# Patient Record
Sex: MALE | Race: WHITE
[De-identification: names, ages, dates, MRNs, and addresses within clinical notes are randomized per-mention and may not be internally consistent; named-entity substitution may affect disease eponyms.]

---

## 2019-01-21 ENCOUNTER — HOSPITAL ENCOUNTER (EMERGENCY)
Dept: HOSPITAL 52 - LL.ED | Age: 84
Discharge: NURSING, REHAB OR LONG-TERM CARE | End: 2019-01-21
Payer: COMMERCIAL

## 2019-01-21 DIAGNOSIS — J43.1: ICD-10-CM

## 2019-01-21 DIAGNOSIS — I25.2: ICD-10-CM

## 2019-01-21 DIAGNOSIS — M06.9: ICD-10-CM

## 2019-01-21 DIAGNOSIS — D53.9: ICD-10-CM

## 2019-01-21 DIAGNOSIS — K43.9: ICD-10-CM

## 2019-01-21 DIAGNOSIS — Z87.891: ICD-10-CM

## 2019-01-21 DIAGNOSIS — E79.0: ICD-10-CM

## 2019-01-21 DIAGNOSIS — N18.9: ICD-10-CM

## 2019-01-21 DIAGNOSIS — K21.9: Primary | ICD-10-CM

## 2019-01-21 DIAGNOSIS — I25.10: ICD-10-CM

## 2019-01-21 DIAGNOSIS — I12.9: ICD-10-CM

## 2019-01-21 LAB
CHLORIDE SERPL-SCNC: 102 MMOL/L (ref 98–107)
SODIUM SERPL-SCNC: 142 MMOL/L (ref 136–145)

## 2019-01-21 PROCEDURE — C9113 INJ PANTOPRAZOLE SODIUM, VIA: HCPCS

## 2019-01-21 NOTE — EDM.PDOC
ED HPI GENERAL MEDICAL PROBLEM





- General


Chief Complaint: Abdominal Pain


Stated Complaint: abdominal pain


Time Seen by Provider: 01/21/19 18:05


Source of Information: Reports: Patient, Nursing Home Records, Old Records ([facility] EMR.  No paper hospital chart available.)


History Limitations: Reports: No Limitations





- History of Present Illness


INITIAL COMMENTS - FREE TEXT/NARRATIVE: 





Patient was brought to the emergency room via transport vehicle from [facility] in [place] for evaluation of progressive diffuse abdominal 

pain and cramping with some nausea with patient not having a good bowel 

movement for the last 5-7 days. He did have some mild loose minimal diarrhea 

yesterday evening with patient given Tylenol but no other treatments or 

medications to this point.. He does have a significant history of recurrent 

diverticulitis and multiple  bowel surgeries as below. The patient denies any 

chest pain/pressure, heart flutter, dizziness, orthostasis, orthopnea, 

diaphoresis, paresthesias, recent decreased exercise tolerance, or any other 

anginal-type symptoms. No recent history of heartburn, emesis, significant 

diarrhea, melena, gross hematochezia, or any food intolerance, including fatty 

foods, etc.. The patient also denies any recent fever, wheezing, progressive 

dyspnea, etc., although he has had a yellowish productive cough during the last 

several days. He denies any gross hematuria, colic, or other UTI symptoms. 

Patient states his symptoms have progressed since Saturday evening.


Onset: Gradual


Onset Date: 01/19/19


Duration: Constant, Getting Worse


Location: Reports: Abdomen.  Denies: Head, Face, Neck, Chest, Back, Pelvis, 

Upper Extremity, Left, Upper Extremity, Right, Radiates to


Quality: Reports: Pressure, Same as Previous Episode


Severity: Moderate


Improves with: Reports: None


Worsens with: Reports: None


Context: Reports: Other (As above).  Denies: Sick Contact, Trauma


Associated Symptoms: Reports: Cough, cough w sputum, Nausea/Vomiting (No emesis)

, Shortness of Breath (Stable chronic with O2 therapy).  Denies: Confusion, 

Chest Pain, Diaphoresis, Fever/Chills, Headaches, Loss of Appetite, Malaise, 

Seizure, Syncope, Weakness


Treatments PTA: Reports: Acetaminophen





- Related Data


 Allergies











Allergy/AdvReac Type Severity Reaction Status Date / Time


 


No Known Drug Allergies Allergy  Other Verified 01/21/19 18:06














Past Medical History


HEENT History: Reports: Impaired Vision, Other (See Below)


Other HEENT History: Patient wears reading glasses.


Cardiovascular History: Reports: CAD, High Cholesterol, Hypertension, MI


Respiratory History: Reports: Bronchitis, Recurrent, COPD, Intubation, Previous

, Pneumonia, Recurrent, Sleep Apnea, Other (See Below).  Denies: Intubation, 

Difficult


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 

Benign pulmonary nodule.


Gastrointestinal History: Reports: Bowel Obstruction, Chronic Constipation, 

Chronic Diarrhea, Diverticulosis, GERD, Irritable Bowel Syndrome, Other (See 

Below)


Other Gastrointestinal History: Recurrent diverticulitis requiring surgeries as 

below. Nonoperable ventral abdominal and inguinal hernias.


Genitourinary History: Reports: Chronic Renal Insuffiency, Dialysis, Peritoneal

, Retention, Urinary


Musculoskeletal History: Reports: Arthritis, Back Pain, Chronic, Fracture, Neck 

Pain, Chronic, Osteoarthritis, RA, Other (See Below)


Other Musculoskeletal History: Right hip fracture and left elbow fracture with 

surgeries as below.


Psychiatric History: Reports: Anxiety, Depression, Other (See Below)


Other Psychiatric History: Chronic insomnia.


Endocrine/Metabolic History: Reports: Vitamin D Deficiency, Other (See Below)


Other Endocrine/Metabolic History: Hyperkalemia.


Hematologic History: Reports: Anemia, B12 Deficiency, Iron Deficiency





- Infectious Disease History


Infectious Disease History: Reports: MRSA





- Past Surgical History


GI Surgical History: Reports: Appendectomy, Cholecystectomy, Colon, Other (See 

Below)


Other GI Surgeries/Procedures: Multiple previous abdominal surgeries including 

possible partial colectomies secondary to diverticulitis and recurrent bowel 

obstructions.


Musculoskeletal Surgical History: Reports: Joint Replacement, ORIF, Other (See 

Below)


Other Musculoskeletal Surgeries/Procedures:: Left elbow prosthesis in December 2017 with concomitant right hip ORIF





Social & Family History





- Tobacco Use


Smoking Status *Q: Former Smoker


Tobacco Use Within Last Twelve Months: No


Years of Tobacco use: 30


Packs/Tins Daily: 1.5


Packs/Tins Daily Comment: Stop smoking in 1986.


Used Tobacco, but Quit: Yes


Smoking Cessation Information Provided To Patient: No


Second Hand Smoke Exposure: No


Second Hand Smoke Education Provided: No





- Living Situation & Occupation


Living situation: Reports: Extended Care Facility ([facility] 

in [facility])





ED ROS GENERAL





- Review of Systems


Review Of Systems: ROS reveals no pertinent complaints other than HPI.





ED EXAM, GI/ABD





- Physical Exam


Exam: See Below


Exam Limited By: No Limitations


General Appearance: Alert, WD/WN, No Apparent Distress, Anxious (Mild)


Eyes: Bilateral: Normal Appearance (No nystagmus), EOMI (PERRLA)


Ears: Normal External Exam, Normal Canal, Hearing Grossly Normal, Normal TMs


Nose: Normal Inspection, Normal Mucosa, No Blood


Throat/Mouth: Normal Lips, Normal Gums, Normal Oropharynx, No Airway 

Compromise.  No: Normal Teeth (Complete dentures uppers and lowers), Dysphagia, 

Perioral Cyanosis


Head: Atraumatic, Normocephalic.  No: Facial Swelling, Facial Tenderness


Neck: Supple, Non-Tender, Full Range of Motion, Carotid Bruit (Bilateral 

carotid bruitsmild).  No: Lymphadenopathy (L), Lymphadenopathy (R), Thyromegaly


Respiratory/Chest: No Respiratory Distress, Lungs Clear, Normal Breath Sounds, 

No Accessory Muscle Use, Chest Non-Tender.  No: Pleural Rub, Retractions


Cardiovascular: Normal Peripheral Pulses, Regular Rate, Rhythm, No Edema, No 

Gallop, No JVD, No Murmur, No Rub.  No: Gallop/S3, Gallop/S4, Friction Rub


GI/Abdominal Exam: Pelvis Stable, Distended (Severe abdominal distention), Rigid

, Tender (Mild diffuse palpation pain), Abnormal Bowel Sounds (Moderate 

decreased diffuse high-pitched bowel sounds), Hernia (20 cm in diameter large 

ventral abdominal hernia with 8 cm periumbilical hernia and multiple abdominal 

incisions from previous surgeries).  No: Guarding, Rebound


 (Male) Exam: Deferred


Rectal (Males) Exam: Deferred


Back Exam: Normal Inspection, Full Range of Motion.  No: CVA Tenderness (L), 

CVA Tenderness (R), Muscle Spasm


Extremities: Normal Inspection, Normal Range of Motion, Non-Tender, No Pedal 

Edema, Normal Capillary Refill.  No: Darren's Sign


Neurological: Alert, Oriented, CN II-XII Intact, Normal Cognition, Normal Gait, 

No Motor/Sensory Deficits


Psychiatric: Anxious (Mild).  No: Depressed Mood


Skin Exam: Warm, Dry, Intact, Normal Color, No Rash.  No: Diaphoretic, 

Ecchymosis, Wound/Incision


Lymphatic: No Adenopathy





Course





- Vital Signs


Last Recorded V/S: 


 Last Vital Signs











Temp  36.1 C   01/21/19 19:11


 


Pulse  90   01/21/19 19:11


 


Resp  20   01/21/19 19:11


 


BP  106/65   01/21/19 19:11


 


Pulse Ox  3 L  01/21/19 19:11











 Vital Signs - 24 hr











  01/21/19 01/21/19 01/21/19





  18:05 18:30 19:11


 


Temperature [ 36.7 C  36.1 C





Temporal]   


 


Pulse, 81 74 90





Peripheral [   





Left Pulse   





Oximetry]   


 


Respiratory 16 16 20





Rate   


 


Blood Pressure 106/65 110/64 106/65





[Right Upper   





Arm]   


 


O2 Sat by Pulse 94 L 95 3 L





Oximetry   














- Orders/Labs/Meds


Orders: 


 Active Orders 24 hr











 Category Date Time Status


 


 Cardiac Monitoring [RC] .AS DIRECTED Care  01/21/19 19:15 Active


 


 Peripheral IV Care [RC] .AS DIRECTED Care  01/21/19 18:11 Active


 


 Abdomen Series w Chest 1V [CR] Stat Exams  01/21/19 18:11 Taken


 


 CULTURE BLOOD [BC] Stat Lab  01/21/19 18:25 Received


 


 CULTURE BLOOD [BC] Stat Lab  01/21/19 18:32 Received


 


 CULTURE SPUTUM + SMEAR [RM] Routine Lab  01/21/19 19:01 Results


 


 Blood Culture x2 Reflex Set [OM.PC] Urgent Oth  01/21/19 18:11 Ordered


 


 Nasogastric Orogastric Tube Insertion [OM.PC] Routine Oth  01/21/19 19:11 

Ordered


 


 Obtain Past Medical Record [OM.PC] Urgent Oth  01/21/19 18:11 Active


 


 Peripheral IV Insertion Adult [OM.PC] Stat Oth  01/21/19 18:11 Ordered


 


 Resuscitation Status Stat Resus Stat  01/21/19 18:11 Ordered











Labs: 


 Laboratory Tests











  01/21/19 01/21/19 01/21/19 Range/Units





  18:25 18:25 18:25 


 


WBC   9.3   (4.0-10.2)  K/uL


 


RBC   3.40 L   (4.33-5.41)  M/uL


 


Hgb   12.1 L   (13.1-16.8)  g/dL


 


Hct   36.8 L   (39.0-49.0)  %


 


MCV   108.2 H   (84.0-98.0)  fL


 


MCH   35.6 H   (28.2-33.3)  pg


 


MCHC   32.9   (31.7-36.0)  g/dL


 


RDW   15.2 H   (11.2-14.1)  %


 


Plt Count   205   (150-350)  K/uL


 


Neut % (Auto)   69.2   (45.0-80.0)  %


 


Lymph % (Auto)   16.7   (10.0-50.0)  %


 


Mono % (Auto)   12.0   (2.0-14.0)  %


 


Eos % (Auto)   1.8   (0.0-5.0)  %


 


Baso % (Auto)   0.3   (0.0-2.0)  %


 


Neut # (Auto)   6.44   (1.40-7.00)  K/uL


 


Lymph # (Auto)   1.56   (0.50-3.50)  K/uL


 


Mono # (Auto)   1.12 H   (0.00-1.00)  K/uL


 


Eos # (Auto)   0.17   (0.00-0.50)  K/uL


 


Baso # (Auto)   0.03   (0.00-0.20)  K/uL


 


PT    11.2  (9.5-12.0)  SEC


 


INR    1.0  


 


APTT    25.9  (21.0-31.3)  SEC


 


Sodium     (136-145)  mmol/L


 


Potassium     (3.5-5.1)  mmol/L


 


Chloride     ()  mmol/L


 


Carbon Dioxide     (21.0-32.0)  mmol/L


 


BUN     (7-18)  mg/dL


 


Creatinine     (0.51-1.17)  mg/dL


 


Est Cr Clr Drug Dosing     


 


Estimated GFR (MDRD)     mL/min


 


Glucose     ()  mg/dL


 


Lactic Acid     (0.4-2.0)  mmol/L


 


Uric Acid     (2.6-7.2)  mg/dL


 


Calcium     (8.5-10.1)  mg/dL


 


Magnesium     (1.8-2.4)  mg/dL


 


Total Bilirubin     (0.2-1.0)  mg/dL


 


AST     (15-37)  U/L


 


ALT     (12-78)  U/L


 


Alkaline Phosphatase     ()  IU/L


 


Total Protein     (6.4-8.2)  g/dL


 


Albumin     (3.4-5.0)  g/dL


 


Amylase  50    ()  U/L


 


Lipase     ()  U/L














  01/21/19 01/21/19 Range/Units





  18:25 18:25 


 


WBC    (4.0-10.2)  K/uL


 


RBC    (4.33-5.41)  M/uL


 


Hgb    (13.1-16.8)  g/dL


 


Hct    (39.0-49.0)  %


 


MCV    (84.0-98.0)  fL


 


MCH    (28.2-33.3)  pg


 


MCHC    (31.7-36.0)  g/dL


 


RDW    (11.2-14.1)  %


 


Plt Count    (150-350)  K/uL


 


Neut % (Auto)    (45.0-80.0)  %


 


Lymph % (Auto)    (10.0-50.0)  %


 


Mono % (Auto)    (2.0-14.0)  %


 


Eos % (Auto)    (0.0-5.0)  %


 


Baso % (Auto)    (0.0-2.0)  %


 


Neut # (Auto)    (1.40-7.00)  K/uL


 


Lymph # (Auto)    (0.50-3.50)  K/uL


 


Mono # (Auto)    (0.00-1.00)  K/uL


 


Eos # (Auto)    (0.00-0.50)  K/uL


 


Baso # (Auto)    (0.00-0.20)  K/uL


 


PT    (9.5-12.0)  SEC


 


INR    


 


APTT    (21.0-31.3)  SEC


 


Sodium  142   (136-145)  mmol/L


 


Potassium  3.7   (3.5-5.1)  mmol/L


 


Chloride  102   ()  mmol/L


 


Carbon Dioxide  30.1   (21.0-32.0)  mmol/L


 


BUN  15   (7-18)  mg/dL


 


Creatinine  1.16   (0.51-1.17)  mg/dL


 


Est Cr Clr Drug Dosing  TNP   


 


Estimated GFR (MDRD)  > 60   mL/min


 


Glucose  110 H   ()  mg/dL


 


Lactic Acid   0.9  (0.4-2.0)  mmol/L


 


Uric Acid  7.8 H   (2.6-7.2)  mg/dL


 


Calcium  9.1   (8.5-10.1)  mg/dL


 


Magnesium  2.0   (1.8-2.4)  mg/dL


 


Total Bilirubin  0.6   (0.2-1.0)  mg/dL


 


AST  20   (15-37)  U/L


 


ALT  22   (12-78)  U/L


 


Alkaline Phosphatase  99   ()  IU/L


 


Total Protein  7.0   (6.4-8.2)  g/dL


 


Albumin  3.7   (3.4-5.0)  g/dL


 


Amylase    ()  U/L


 


Lipase  112   ()  U/L











Meds: 


Medications














Discontinued Medications














Generic Name Dose Route Start Last Admin





  Trade Name Freq  PRN Reason Stop Dose Admin


 


Famotidine  40 mg  01/21/19 18:11  01/21/19 18:43





  Pepcid  IVPUSH  01/21/19 18:12  40 mg





  ONETIME ONE   Administration





     





     





     





     


 


Ceftriaxone Sodium 1 gm/  100 mls @ 200 mls/hr  01/21/19 18:11  01/21/19 18:42





  Sodium Chloride  IV  01/21/19 18:40  200 mls/hr





  ONETIME ONE   Administration





     





     





     





     


 


Metronidazole 500 mg/ Premix  100 mls @ 100 mls/hr  01/21/19 18:11  01/21/19 19:

17





  IV  01/21/19 19:10  100 mls/hr





  ONETIME ONE   Administration





     





     





     





     


 


Ondansetron HCl  4 mg  01/21/19 18:11  01/21/19 18:43





  Zofran  IVPUSH  01/21/19 18:12  4 mg





  ONETIME ONE   Administration





     





     





     





     


 


Pantoprazole Sodium  40 mg  01/21/19 18:11  01/21/19 18:42





  Protonix Iv***  IVPUSH  01/21/19 18:12  40 mg





  ONETIME ONE   Administration





     





     





     





     


 


Sodium Chloride  10 ml  01/21/19 18:11  





  Saline Flush  FLUSH   





  ASDIRECTED PRN   





  Keep Vein Open   





     





     





     














- Radiology Interpretation


Free Text/Narrative:: 





Acute abdominal x-rays shows severe diffuse bowel gaseous distention with 

questionable free air however difficult to determine secondary to above bowel 

findings. Evidence of probable obstruction versus volvulus. Note moderate 

aortic valve calcification with additional multiple pulmonary obstructive 

disease but no pneumothorax, CHF, etc.. Note status post right hip ORIF and 

osteoarthritic changes in thoracic and lumbar spine.





Note subsequent telephone consultation with Dr. Power, radiologist, who does 

agree with the above findings, although he does not suspect free air at this 

time. CT scan advisable.





Departure





- Departure


Time of Disposition: 21:45


Disposition: DC/Tfer to Acute Hospital 02


Condition: Fair


Clinical Impression: 


 Peptic reflux disease, Renal insufficiency, Macrocytic anemia, Hyperuricemia





Abdominal pain


Qualifiers:


 Abdominal location: generalized Qualified Code(s): R10.84 - Generalized 

abdominal pain





COPD (chronic obstructive pulmonary disease)


Qualifiers:


 COPD type: emphysema Emphysema type: panlobular Qualified Code(s): J43.1 - 

Panlobular emphysema





Rheumatoid arthritis


Qualifiers:


 Rheumatoid arthritis location: multiple sites Rheumatoid factor presence: 

unspecified presence Qualified Code(s): M06.9 - Rheumatoid arthritis, 

unspecified





Hypertension


Qualifiers:


 Hypertension type: essential hypertension Qualified Code(s): I10 - Essential (

primary) hypertension





Coronary artery disease


Qualifiers:


 Coronary Disease-Associated Artery/Lesion type: native artery Native vs. 

transplanted heart: native heart Associated angina: without angina Qualified 

Code(s): I25.10 - Atherosclerotic heart disease of native coronary artery 

without angina pectoris





Abdominal hernia


Qualifiers:


 Hernia type: ventral Obstruction and gangrene presence: without obstruction or 

gangrene Qualified Code(s): K43.9 - Ventral hernia without obstruction or 

gangrene








- Discharge Information


*PRESCRIPTION DRUG MONITORING PROGRAM REVIEWED*: Not Applicable


*COPY OF PRESCRIPTION DRUG MONITORING REPORT IN PATIENT ERIK: Not Applicable


Referrals: 


Sheets-Leelee Shaw MD [Primary Care Provider] - 


Forms:  ED Department Discharge, Interfacility Transfer EMTALA





- Problem List & Annotations


(1) Abdominal pain


SNOMED Code(s): 70812572


   Code(s): R10.9 - UNSPECIFIED ABDOMINAL PAIN   Status: Acute   Priority: High

   Onset Date: ~01/19/19   Annotation/Comment:: Progressive abdominal pain 

likely secondary to developing bowel obstruction secondary to either 

diverticulitis, abdominal adhesions, or volvulus. CT scan of the abdomen and 

pelvis is advisable. Our CT scanner is unfortunately down this evening. Initial

  telephone consultation at 19:15 hours with Dr. Partida, hospitalist at the [facility] in [place], who wanted to consult with her general surgeon prior to 

accepting the patient. Subsequent telephone consultation at 19:45 hours with 

patient accepted for direct admission to their facility. No further treatment 

recommendations were given. Secondary to ambulance availability hospital 

transfer was delayed without sequelae. Basic ambulance transport apparently 

also not available. Note immediately upon patient's arrival to the emergency 

room we did treat him prophylactically with IV Rocephin, IV Flagyl, IV Pepcid, 

and IV Protonix. He was also given IV Zofran for his mild nausea. The patient 

refused recommended NG tube which was canceled per his request. Vital signs and 

physical exam were stable at time of transfer.    


Qualifiers: 


   Abdominal location: generalized   Qualified Code(s): R10.84 - Generalized 

abdominal pain   





(2) Abdominal hernia


SNOMED Code(s): 39419698


   Code(s): K46.9 - UNSPECIFIED ABDOMINAL HERNIA WITHOUT OBSTRUCTION OR 

GANGRENE   Status: Chronic   Priority: Medium   Annotation/Comment:: Inoperable 

by patient history.   


Qualifiers: 


   Hernia type: ventral   Obstruction and gangrene presence: without 

obstruction or gangrene   Qualified Code(s): K43.9 - Ventral hernia without 

obstruction or gangrene   





(3) COPD (chronic obstructive pulmonary disease)


SNOMED Code(s): 48795974


   Code(s): J44.9 - CHRONIC OBSTRUCTIVE PULMONARY DISEASE, UNSPECIFIED   Status

: Chronic   Priority: Medium   Annotation/Comment:: Yellowish productive cough 

with sputum obtained for culture and sensitivity. No direct pneumonia by today'

s x-rays, however. IV Rocephin started as above. No fever or leukocytosis.   


Qualifiers: 


   COPD type: emphysema   Emphysema type: panlobular   Qualified Code(s): J43.1 

- Panlobular emphysema   





(4) Coronary artery disease


SNOMED Code(s): 90578915


   Code(s): I25.10 - ATHSCL HEART DISEASE OF NATIVE CORONARY ARTERY W/O ANG 

PCTRS   Status: Chronic   Priority: Medium   Annotation/Comment:: No recent 

chest pain or anginal type symptoms   


Qualifiers: 


   Coronary Disease-Associated Artery/Lesion type: native artery   Native vs. 

transplanted heart: native heart   Associated angina: without angina   

Qualified Code(s): I25.10 - Atherosclerotic heart disease of native coronary 

artery without angina pectoris   





(5) Hypertension


SNOMED Code(s): 03418726


   Code(s): I10 - ESSENTIAL (PRIMARY) HYPERTENSION   Status: Chronic   Priority

: Medium   Annotation/Comment:: The blood pressures were stable in the 

emergency room   


Qualifiers: 


   Hypertension type: essential hypertension   Qualified Code(s): I10 - 

Essential (primary) hypertension   





(6) Hyperuricemia


SNOMED Code(s): 94560788


   Code(s): E79.0 - HYPERURICEMIA W/O SIGNS OF INFLAM ARTHRIT AND TOPHACEOUS 

DIS   Status: Acute   Priority: Medium   Onset Date: 01/21/19   Annotation/

Comment:: Newly diagnosed.   





(7) Macrocytic anemia


SNOMED Code(s): 65480240


   Code(s): D53.9 - NUTRITIONAL ANEMIA, UNSPECIFIED   Status: Chronic   Priority

: Medium   Annotation/Comment:: Known history of vitamin B-12 and folic acid 

deficiency with current supplementation.   





(8) Peptic reflux disease


SNOMED Code(s): 933435316


   Code(s): K21.9 - GASTRO-ESOPHAGEAL REFLUX DISEASE WITHOUT ESOPHAGITIS   

Status: Chronic   Priority: Medium   Annotation/Comment:: Stable by history 

with IV Pepcid and IV Protonix given as above.   





(9) Renal insufficiency


SNOMED Code(s): 887454526, 338644526


   Code(s): N28.9 - DISORDER OF KIDNEY AND URETER, UNSPECIFIED   Status: 

Chronic   Priority: Medium   Annotation/Comment:: Stable by history with normal 

creatinine today.   





(10) Rheumatoid arthritis


SNOMED Code(s): 09476833


   Code(s): M06.9 - RHEUMATOID ARTHRITIS, UNSPECIFIED   Status: Chronic   

Priority: Medium   Annotation/Comment:: Stable by history.   


Qualifiers: 


   Rheumatoid arthritis location: multiple sites   Rheumatoid factor presence: 

unspecified presence   Qualified Code(s): M06.9 - Rheumatoid arthritis, 

unspecified   





- Problem List Review


Problem List Initiated/Reviewed/Updated: Yes





- My Orders


Last 24 Hours: 


My Active Orders





01/21/19 18:11


Peripheral IV Care [RC] .AS DIRECTED 


Abdomen Series w Chest 1V [CR] Stat 


Blood Culture x2 Reflex Set [OM.PC] Urgent 


Obtain Past Medical Record [OM.PC] Urgent 


Peripheral IV Insertion Adult [OM.PC] Stat 


Resuscitation Status Stat 





01/21/19 18:25


CULTURE BLOOD [BC] Stat 





01/21/19 18:32


CULTURE BLOOD [BC] Stat 





01/21/19 19:01


CULTURE SPUTUM + SMEAR [RM] Routine 





01/21/19 19:11


Nasogastric Orogastric Tube Insertion [OM.PC] Routine 





01/21/19 19:15


Cardiac Monitoring [RC] .AS DIRECTED 














- Assessment/Plan


Last 24 Hours: 


My Active Orders





01/21/19 18:11


Peripheral IV Care [RC] .AS DIRECTED 


Abdomen Series w Chest 1V [CR] Stat 


Blood Culture x2 Reflex Set [OM.PC] Urgent 


Obtain Past Medical Record [OM.PC] Urgent 


Peripheral IV Insertion Adult [OM.PC] Stat 


Resuscitation Status Stat 





01/21/19 18:25


CULTURE BLOOD [BC] Stat 





01/21/19 18:32


CULTURE BLOOD [BC] Stat 





01/21/19 19:01


CULTURE SPUTUM + SMEAR [RM] Routine 





01/21/19 19:11


Nasogastric Orogastric Tube Insertion [OM.PC] Routine 





01/21/19 19:15


Cardiac Monitoring [RC] .AS DIRECTED 











Assessment:: 





As above


Plan: 





As above. Extensive precautions were given to the patient, who is in agreement 

with the treatment plan. Ambulance transfer to [place] with paramedic 

accompaniment as above.

## 2019-06-13 ENCOUNTER — HOSPITAL ENCOUNTER (INPATIENT)
Dept: HOSPITAL 52 - LL.MS | Age: 84
LOS: 1 days | Discharge: NURSING, REHAB OR LONG-TERM CARE | DRG: 178 | End: 2019-06-14
Attending: FAMILY MEDICINE | Admitting: PHYSICIAN ASSISTANT
Payer: MEDICARE

## 2019-06-13 DIAGNOSIS — Z79.51: ICD-10-CM

## 2019-06-13 DIAGNOSIS — Z87.891: ICD-10-CM

## 2019-06-13 DIAGNOSIS — H54.7: ICD-10-CM

## 2019-06-13 DIAGNOSIS — F41.9: ICD-10-CM

## 2019-06-13 DIAGNOSIS — G89.29: ICD-10-CM

## 2019-06-13 DIAGNOSIS — F32.9: ICD-10-CM

## 2019-06-13 DIAGNOSIS — I25.10: ICD-10-CM

## 2019-06-13 DIAGNOSIS — J44.9: ICD-10-CM

## 2019-06-13 DIAGNOSIS — I10: ICD-10-CM

## 2019-06-13 DIAGNOSIS — M54.9: ICD-10-CM

## 2019-06-13 DIAGNOSIS — E55.9: ICD-10-CM

## 2019-06-13 DIAGNOSIS — Z90.49: ICD-10-CM

## 2019-06-13 DIAGNOSIS — K59.09: ICD-10-CM

## 2019-06-13 DIAGNOSIS — Z79.899: ICD-10-CM

## 2019-06-13 DIAGNOSIS — E78.00: ICD-10-CM

## 2019-06-13 DIAGNOSIS — K21.9: ICD-10-CM

## 2019-06-13 DIAGNOSIS — I25.2: ICD-10-CM

## 2019-06-13 DIAGNOSIS — Z99.81: ICD-10-CM

## 2019-06-13 DIAGNOSIS — G47.30: ICD-10-CM

## 2019-06-13 DIAGNOSIS — J96.11: ICD-10-CM

## 2019-06-13 DIAGNOSIS — J15.1: Primary | ICD-10-CM

## 2019-06-13 RX ADMIN — METHYLPREDNISOLONE SODIUM SUCCINATE SCH MG: 40 INJECTION, POWDER, FOR SOLUTION INTRAMUSCULAR; INTRAVENOUS at 17:47

## 2019-06-13 RX ADMIN — Medication SCH ML: at 19:34

## 2019-06-13 RX ADMIN — Medication PRN ML: at 15:49

## 2019-06-13 RX ADMIN — TRIAMCINOLONE ACETONIDE SCH APPLIC: 5 CREAM TOPICAL at 19:33

## 2019-06-13 NOTE — PCM.HP
H&P History of Present Illness





- General


Date of Service: 06/13/19


Admit Problem/Dx: 


 Admission Diagnosis/Problem





Admission Diagnosis/Problem      Pneumonia








Source of Information: Patient, Nursing Home Records


History Limitations: Reports: No Limitations





- History of Present Illness


Initial Comments - Free Text/Narative: 





Seen two days ago at the [facility] for increased SOB.  Physical exam and labs done.  

Sputum C&S obtained and returned today with abundant Pseudomonas aeruginosa, 

and the decision was made to admit for treatment.


Onset of Symptoms: Reports: Gradual


Duration of Symptoms: Reports: Day(s):


Location: Reports: Chest


Improves with: Reports: Medication (nebs), Rest


Worsens with: Reports: Movement


Context: Reports: Other (chronic lung disease)


Associated Symptoms: Reports: Cough, cough w sputum, Malaise, Shortness of 

Breath, Weakness





- Related Data


Allergies/Adverse Reactions: 


 Allergies











Allergy/AdvReac Type Severity Reaction Status Date / Time


 


No Known Drug Allergies Allergy  Other Verified 01/21/19 18:06











Home Medications: 


 Home Meds





Albuterol Sulfate [Proair Respiclick] 90 mcg IH QID 01/22/19 [History]


Albuterol/Ipratropium [DuoNeb 3.0-0.5 MG/3 ML] 1 ampule INH Q4HR PRN 01/22/19 [

History]


Albuterol/Ipratropium [DuoNeb 3.0-0.5 MG/3 ML] 1 ampule INH QID 01/22/19 [

History]


Budesonide [Pulmicort] 1 ampule INH BID 01/22/19 [History]


Calcium Carbonate/Vitamin D3 [OystCal-D 250 MG-125 Units] 1 tab PO DAILY 01/22/ 19 [History]


Cholecalciferol (Vitamin D3) [Vitamin D3] 2,000 unit PO DAILY 01/22/19 [History]


Cyanocobalamin (Vitamin B-12) [B-12] 1 tab PO DAILY 01/22/19 [History]


Eucalyptus Oil/Menthol/Camphor [Vicks Vaporub Ointment] 1 applic TP ASDIRECTED 

PRN 01/22/19 [History]


Fluticasone Furoate [Arnuity Ellipta] 2 sprays IH DAILY 01/22/19 [History]


Folic Acid 1 mg PO DAILY 01/22/19 [History]


L.acidoph,Saliva/B.bif/S.therm [Acidophilus 175 mg Capsule] 1 cap PO DAILY 01/22 /19 [History]


Lidocaine/Transparent Dressing [Lidocaine 4% Kit] 1 each TP TID PRN 01/22/19 [

History]


Loperamide [Imodium AD] 2 mg PO ASDIRECTED PRN 01/22/19 [History]


Loratadine 10 mg PO DAILY 01/22/19 [History]


Magnesium Oxide 420 mg PO BID 01/22/19 [History]


Methotrexate Sodium [Methotrexate] 15 mg PO WEEKLY 01/22/19 [History]


Mirtazapine 45 mg PO DAILY 01/22/19 [History]


Olodaterol HCl [Striverdi Respimat] 2 puff INH DAILY 01/22/19 [History]


PARoxetine HCl [Paxil] 40 mg PO DAILY 01/22/19 [History]


Polyethylene Glycol 3350 [MiraLAX] 17 gm PO DAILY PRN 01/22/19 [History]


Triamcinolone Acetonide [Triamcinolone Acetonide 0.025%] 15 gm .XX TID 01/22/19 

[History]


hydrOXYzine HCl [Atarax] 10 mg PO BEDTIME 01/22/19 [History]


hydroCHLOROthiazide [Hydrochlorothiazide] 25 mg PO DAILY 01/22/19 [History]


oxyCODONE 5 mg PO Q6HR PRN 01/22/19 [History]











Past Medical History


HEENT History: Reports: Impaired Vision, Other (See Below)


Other HEENT History: Patient wears reading glasses.


Cardiovascular History: Reports: CAD, High Cholesterol, Hypertension, MI


Respiratory History: Reports: Bronchitis, Recurrent, COPD, Intubation, Previous

, Pneumonia, Recurrent, Sleep Apnea, Other (See Below).  Denies: Intubation, 

Difficult


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 

Benign pulmonary nodule.


Gastrointestinal History: Reports: Bowel Obstruction, Chronic Constipation, 

Chronic Diarrhea, Diverticulosis, GERD, Irritable Bowel Syndrome, Other (See 

Below)


Other Gastrointestinal History: Recurrent diverticulitis requiring surgeries as 

below. Nonoperable ventral abdominal and inguinal hernias.


Genitourinary History: Reports: Chronic Renal Insuffiency, Dialysis, Peritoneal

, Retention, Urinary


Musculoskeletal History: Reports: Arthritis, Back Pain, Chronic, Fracture, Neck 

Pain, Chronic, Osteoarthritis, RA, Other (See Below)


Other Musculoskeletal History: Right hip fracture and left elbow fracture with 

surgeries as below.


Psychiatric History: Reports: Anxiety, Depression, Other (See Below)


Other Psychiatric History: Chronic insomnia.


Endocrine/Metabolic History: Reports: Vitamin D Deficiency, Other (See Below)


Other Endocrine/Metabolic History: Hyperkalemia.


Hematologic History: Reports: Anemia, B12 Deficiency, Iron Deficiency





- Infectious Disease History


Infectious Disease History: Reports: MRSA





- Past Surgical History


GI Surgical History: Reports: Appendectomy, Cholecystectomy, Colon, Other (See 

Below)


Other GI Surgeries/Procedures: Multiple previous abdominal surgeries including 

possible partial colectomies secondary to diverticulitis and recurrent bowel 

obstructions.


Musculoskeletal Surgical History: Reports: Joint Replacement, ORIF, Other (See 

Below)


Other Musculoskeletal Surgeries/Procedures:: Left elbow prosthesis in December 2017 with concomitant right hip ORIF





Social & Family History





- Tobacco Use


Smoking Status *Q: Former Smoker


Tobacco Use Within Last Twelve Months: No


Used Tobacco, but Quit: Yes





- Caffeine Use


Caffeine Use: Reports: Coffee





- Alcohol Use


Alcohol Use History: Yes


Alcohol Use in Last Twelve Months: Yes


Alcohol Use Frequency: Rarely





- Recreational Drug Use


Recreational Drug Use: No


Drug Use in Last 12 Months: No





- Living Situation & Occupation


Living situation: Reports: Extended Care Facility ([facility] 

in [facility])





H&P Review of Systems





- Review of Systems:


Review Of Systems: ROS reveals no pertinent complaints other than HPI.





Exam





- Exam


Exam: See Below





- Vital Signs


Vital Signs: 


 Last Vital Signs











Temp  99.1 F   06/13/19 14:53


 


Pulse  94   06/13/19 14:53


 


Resp  18   06/13/19 14:53


 


BP  162/104 H  06/13/19 14:53


 


Pulse Ox  93 L  06/13/19 14:53











Weight: 183 lb





- Exam


Quality Assessment: Supplemental Oxygen (typically uses at 2-3 L/m, recently 

increased per self to 4-5 L/m)


General: Alert, Oriented, 4


HEENT: Hearing Intact, Posterior Pharynx Clear


Neck: Supple, Trachea Midline


Lungs: Decreased Breath Sounds


Cardiovascular: Regular Rate, Regular Rhythm


GI/Abdominal Exam: Normal Bowel Sounds, Soft, Non-Tender, Hernia (ventral)


 (Male) Exam: Deferred


Rectal (Males) Exam: Deferred


Back Exam: Normal Inspection


Extremities: Pedal Edema (mild)


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 

followed by Derm)


Neuro Extensive - Mental Status: Alert, Oriented x3, Normal Mood/Affect


Psychiatric: Alert, Normal Affect, Normal Mood





- Problem List


(1) Pneumonia due to Pseudomonas aeruginosa


SNOMED Code(s): 35494000


   ICD Code: J15.1 - PNEUMONIA DUE TO PSEUDOMONAS   Status: Acute   Priority: 

High   Current Visit: Yes   Onset Date: ~06/11/19   


Problem List Initiated/Reviewed/Updated: Yes


Orders Last 24hrs: 


 Active Orders 24 hr











 Category Date Time Status


 


 Patient Status [ADT] Routine ADT  06/13/19 14:53 Active


 


 Antiembolic Devices [RC] PER UNIT ROUTINE Care  06/13/19 14:56 Active


 


 Height and Weight [RC] DAILY Care  06/13/19 14:53 Active


 


 Intake and Output [RC] QSHIFT Care  06/13/19 14:55 Active


 


 May Shower [RC] ASDIRECTED Care  06/13/19 14:53 Active


 


 Oxygen Therapy [RC] PRN Care  06/13/19 14:53 Active


 


 Peripheral IV Care [RC] .AS DIRECTED Care  06/13/19 14:57 Active


 


 Up ad Ayesha [RC] ASDIRECTED Care  06/13/19 14:53 Active


 


 VTE/DVT Education [RC] PER UNIT ROUTINE Care  06/13/19 14:53 Active


 


 Vital Signs [RC] Q4H Care  06/13/19 14:53 Active


 


 Consult to Case Management/ [CONS] Cons  06/13/19 15:03 Active





 Routine   


 


 C-REACTIVE PROTEIN [CHEM] AM Lab  06/14/19 05:11 Ordered


 


 C-REACTIVE PROTEIN [CHEM] AM Lab  06/15/19 05:11 Ordered


 


 C-REACTIVE PROTEIN [CHEM] AM Lab  06/16/19 05:11 Ordered


 


 C-REACTIVE PROTEIN [CHEM] AM Lab  06/17/19 05:11 Ordered


 


 CBC WITH AUTO DIFF [HEME] AM Lab  06/14/19 05:11 Ordered


 


 CBC WITH AUTO DIFF [HEME] AM Lab  06/15/19 05:11 Ordered


 


 CBC WITH AUTO DIFF [HEME] AM Lab  06/16/19 05:11 Ordered


 


 CBC WITH AUTO DIFF [HEME] AM Lab  06/17/19 05:11 Ordered


 


 COMPREHENSIVE METABOLIC PN,CMP [CHEM] AM Lab  06/14/19 05:11 Ordered


 


 COMPREHENSIVE METABOLIC PN,CMP [CHEM] AM Lab  06/15/19 05:11 Ordered


 


 COMPREHENSIVE METABOLIC PN,CMP [CHEM] AM Lab  06/16/19 05:11 Ordered


 


 COMPREHENSIVE METABOLIC PN,CMP [CHEM] AM Lab  06/17/19 05:11 Ordered


 


 CULTURE BLOOD [BC] Stat Lab  06/13/19 15:08 Received


 


 CULTURE BLOOD [BC] Stat Lab  06/13/19 15:15 Received


 


 PRO B-TYPE NATRIUR PEPT,BNPPRO [CHEM] Routine Lab  06/14/19 05:11 Ordered


 


 Piperacillin/Tazobactam [Zosyn] 3.375 gm Med  06/13/19 16:00 Active





 Sodium Chloride 0.9% [Normal Saline] 100 ml   





 IV Q6H   


 


 Sodium Chloride 0.9% [Saline Flush] Med  06/13/19 14:57 Active





 10 ml FLUSH ASDIRECTED PRN   


 


 cefTAZidime Pentahydrate [Fortaz] Med  06/13/19 16:00 Active





 1 gm IVPUSH Q8HR   


 


 Antiembolic Hose [OM.PC] Per Unit Routine Oth  06/13/19 14:56 Ordered


 


 Blood Culture x2 Reflex Set [OM.PC] Stat Oth  06/13/19 14:53 Ordered


 


 Peripheral IV Insertion Adult [OM.PC] Routine Oth  06/13/19 14:57 Ordered


 


 Resuscitation Status Routine Resus Stat  06/13/19 14:53 Ordered








 Medication Orders





Ceftazidime (Fortaz)  1 gm IVPUSH Q8HR IVÁN


Piperacillin Sod/Tazobactam (Sod 3.375 gm/ Sodium Chloride)  100 mls @ 200 mls/

hr IV Q6H IVÁN


Sodium Chloride (Saline Flush)  10 ml FLUSH ASDIRECTED PRN


   PRN Reason: Keep Vein Open








Assessment/Plan Comment:: 





06-13-19


ARLETTE Mi PA-C


Patient is being admitted for IP treatment for the Pseudomonas aeruginosa 

pneumonia, using IV Fortaz and Zosyn per the C&S results.  He has long-standing 

oxygen-dependent COPD and Chronic Respiratory Failure with Hypoxia.  He will 

require at least 96 hours of IP treatment.  I did call up to the [facility] and 

spoke with Admissions Coordinator Claire, but they have no bed availability 

today.  Consult ordered for Care Coordinator [facility]  Dr. Hicks notified of this 

admission at the time of admit.

## 2019-06-14 LAB
BASE EXCESS BLDA CALC-SCNC: 8 MMOL/L (ref -2–3)
CHLORIDE SERPL-SCNC: 103 MMOL/L (ref 98–107)
HCO3 BLDA-SCNC: 33.6 MMOL/L (ref 22–26)
INHALED O2 MECHANISM DOSE: (no result)
PCO2 BLDA: 57 MMHG (ref 35–45)
PO2 BLDA: 47 MMHG (ref 80–105)
SAO2 % BLDA: 80 % (ref 95–98)
SODIUM SERPL-SCNC: 143 MMOL/L (ref 136–145)

## 2019-06-14 RX ADMIN — Medication PRN ML: at 09:03

## 2019-06-14 RX ADMIN — Medication PRN ML: at 04:37

## 2019-06-14 RX ADMIN — Medication PRN ML: at 01:02

## 2019-06-14 RX ADMIN — METHYLPREDNISOLONE SODIUM SUCCINATE SCH MG: 40 INJECTION, POWDER, FOR SOLUTION INTRAMUSCULAR; INTRAVENOUS at 09:01

## 2019-06-14 RX ADMIN — TRIAMCINOLONE ACETONIDE SCH APPLIC: 5 CREAM TOPICAL at 09:01

## 2019-06-14 RX ADMIN — Medication SCH ML: at 08:25

## 2019-06-14 RX ADMIN — METHYLPREDNISOLONE SODIUM SUCCINATE SCH MG: 40 INJECTION, POWDER, FOR SOLUTION INTRAMUSCULAR; INTRAVENOUS at 01:01

## 2019-06-14 NOTE — PCM.PN
- General Info


Date of Service: 06/14/19


Admission Dx/Problem (Free Text): 


 Admission Diagnosis/Problem





Admission Diagnosis/Problem      Pneumonia








Functional Status: Reports: Tolerating Diet





- Review of Systems


General: Reports: Weakness, Fatigue


HEENT: Reports: No Symptoms


Pulmonary: Reports: Shortness of Breath, Cough, Sputum


Cardiovascular: Reports: No Symptoms


Gastrointestinal: Reports: No Symptoms


Genitourinary: Reports: No Symptoms


Musculoskeletal: Reports: No Symptoms


Skin: Reports: Rash (chronic)


Neurological: Reports: No Symptoms


Psychiatric: Reports: No Symptoms





- Patient Data


Vitals - Most Recent: 


 Last Vital Signs











Temp  97.8 F   06/14/19 08:00


 


Pulse  85   06/14/19 08:00


 


Resp  17   06/14/19 08:00


 


BP  125/64   06/14/19 08:00


 


Pulse Ox  93 L  06/14/19 08:00











Weight - Most Recent: 182 lb 15.986 oz


I&O - Last 24 Hours: 


 Intake & Output











 06/13/19 06/14/19 06/14/19





 22:59 06:59 14:59


 


Intake Total 


 


Balance 











Lab Results Last 24 Hours: 


 Laboratory Results - last 24 hr











  06/14/19 06/14/19 06/14/19 Range/Units





  07:20 07:20 12:38 


 


WBC  6.4    (4.0-10.2)  K/uL


 


RBC  3.44 L    (4.33-5.41)  M/uL


 


Hgb  11.7 L    (13.1-16.8)  g/dL


 


Hct  36.2 L    (39.0-49.0)  %


 


MCV  105.2 H    (84.0-98.0)  fL


 


MCH  34.0 H    (28.2-33.3)  pg


 


MCHC  32.3    (31.7-36.0)  g/dL


 


RDW  12.9    (11.2-14.1)  %


 


Plt Count  238    (150-350)  K/uL


 


Neut % (Auto)  82.8 H    (45.0-80.0)  %


 


Lymph % (Auto)  12.5    (10.0-50.0)  %


 


Mono % (Auto)  4.5    (2.0-14.0)  %


 


Eos % (Auto)  0.0    (0.0-5.0)  %


 


Baso % (Auto)  0.2    (0.0-2.0)  %


 


Neut # (Auto)  5.28    (1.40-7.00)  K/uL


 


Lymph # (Auto)  0.80    (0.50-3.50)  K/uL


 


Mono # (Auto)  0.29    (0.00-1.00)  K/uL


 


Eos # (Auto)  0.00    (0.00-0.50)  K/uL


 


Baso # (Auto)  0.01    (0.00-0.20)  K/uL


 


ABG pH    7.38  (7.35-7.45)  


 


ABG pCO2    57 H*  (35-45)  mmHG


 


ABG pO2    47 L*  ()  mmHG


 


ABG HCO3    33.6 H  (22-26)  mmol/L


 


ABG Total CO2    35 H  (23-27)  mmol/L


 


ABG O2 Saturation    80 L  (95-98)  %


 


ABG Base Excess    8 H  (-2-3)  mmol/L


 


O2 Delivery Device    Nasal cannula  


 


Sodium   143   (136-145)  mmol/L


 


Potassium   3.7   (3.5-5.1)  mmol/L


 


Chloride   103   ()  mmol/L


 


Carbon Dioxide   33.5 H   (21.0-32.0)  mmol/L


 


BUN   20 H   (7-18)  mg/dL


 


Creatinine   1.01   (0.51-1.17)  mg/dL


 


Est Cr Clr Drug Dosing   51.68   mL/min


 


Estimated GFR (MDRD)   > 60   mL/min


 


Glucose   153 H   ()  mg/dL


 


Calcium   8.6   (8.5-10.1)  mg/dL


 


Total Bilirubin   0.6   (0.2-1.0)  mg/dL


 


AST   17   (15-37)  U/L


 


ALT   19   (12-78)  U/L


 


Alkaline Phosphatase   75   ()  IU/L


 


C-Reactive Protein   2.2 H   (<=0.9)  mg/dL


 


NT-Pro-B Natriuret Pep   494 H   (0-125)  pg/mL


 


Total Protein   6.5   (6.4-8.2)  g/dL


 


Albumin   3.2 L   (3.4-5.0)  g/dL











Med Orders - Current: 


 Current Medications





Acetaminophen (Tylenol Extra Strength)  1,000 mg PO TID PRN


   PRN Reason: Pain


Acetaminophen (Tylenol Extra Strength)  1,000 mg PO DAILY@1600 IVÁN


Albuterol (Proventil Neb Soln)  2.5 mg NEB Q4HR PRN


   PRN Reason: Shortness of Breath


   Last Admin: 06/13/19 17:47 Dose:  2.5 mg


Albuterol (Ventolin Hfa)  0 gm INH Q6H PRN


   PRN Reason: Shortness of Breath


Albuterol/Ipratropium (Duoneb 3.0-0.5 Mg/3 Ml)  3 ml INH QID [facility]


   Last Admin: 06/14/19 11:48 Dose:  3 ml


Arformoterol Tartrate (Brovana)  15 mcg INH Q12H [facility]


   Last Admin: 06/14/19 08:24 Dose:  15 mcg


Aspirin (Halfprin)  81 mg PO DAILY [facility]


   Last Admin: 06/14/19 08:23 Dose:  81 mg


Ceftazidime (Fortaz)  1 gm IVPUSH Q8HR [facility]


   Last Admin: 06/14/19 08:24 Dose:  1 gm


Fluticasone Propionate (Flonase)  0 gm NASBOTH DAILY [facility]


   Last Admin: 06/14/19 08:24 Dose:  2 sprays


Hydrochlorothiazide (Hydrochlorothiazide)  25 mg PO DAILY [facility]


   Last Admin: 06/14/19 08:23 Dose:  25 mg


Hydroxyzine HCl (Atarax)  25 mg PO BID PRN


   PRN Reason: Itching


Hydroxyzine HCl (Atarax)  25 mg PO BID@0800,2000 [facility]


   Last Admin: 06/14/19 08:23 Dose:  25 mg


Piperacillin Sod/Tazobactam (Sod 3.375 gm/ Sodium Chloride)  100 mls @ 200 mls/

hr IV Q6H [facility]


   Last Admin: 06/14/19 09:02 Dose:  200 mls/hr


Loperamide HCl (Imodium Ad)  2 mg PO ASDIRECTED PRN


   PRN Reason: Diarrhea


Loratadine (Claritin)  10 mg PO DAILY [facility]


   Last Admin: 06/14/19 08:23 Dose:  10 mg


Methylprednisolone Sodium Succinate (Solu-Medrol)  40 mg IVPUSH Q8H [facility]


   Last Admin: 06/14/19 09:01 Dose:  40 mg


Mirtazapine (Remeron)  60 mg PO BEDTIME [facility]


   Last Admin: 06/13/19 22:29 Dose:  60 mg


Lidocaine Hcl [ Aspercreme] 1 Applic )  1 applic TP TID PRN


   PRN Reason: Pain


Paroxetine HCl (Paxil)  40 mg PO BEDTIME [facility]


   Last Admin: 06/13/19 22:29 Dose:  40 mg


Polyethylene Glycol (Miralax)  17 gm PO DAILY PRN


   PRN Reason: Constipation


Sodium Chloride (Saline Flush)  10 ml FLUSH ASDIRECTED PRN


   PRN Reason: Keep Vein Open


   Last Admin: 06/14/19 09:03 Dose:  10 ml


Sodium Chloride (Saline Flush)  10 ml FLUSH Q12HR [facility]


   Last Admin: 06/14/19 08:25 Dose:  10 ml


Tiotropium Bromide (Spiriva Handihaler)  18 mcg INH DAILY [facility]


   Last Admin: 06/14/19 08:25 Dose:  18 mcg


Tramadol HCl (Ultram)  50 mg PO BEDTIME PRN


   PRN Reason: Pain


Triamcinolone Acetonide (Triamcinolone Acetonide 0.025%)  0 gm TOP TID PRN


   PRN Reason: Itching


Triamcinolone Acetonide (Triamcinolone Acetonide 0.5%)  0 gm TOP DAILY PRN


   PRN Reason: Itching


Triamcinolone Acetonide (Triamcinolone Acetonide 0.5%)  0 gm TOP TID@0800,1400,

2000 [facility]


   Last Admin: 06/14/19 09:01 Dose:  1 applic





Discontinued Medications





Acetaminophen (Tylenol Extra Strength)  1,000 mg PO DAILY [facility]


Budesonide (Pulmicort)   mg INH BID [facility]


Hydroxyzine HCl (Atarax)  25 mg PO BID [facility]


Mirtazapine (Remeron)  60 mg PO DAILY [facility]


Non-Formulary Medication (Cholecalciferol (Vitamin D3) [Vitamin D3])  2,000 

unit PO DAILY [facility]


Non-Formulary Medication (Cyanocobalamin (Vitamin B-12) [B-12])  1 tab PO DAILY 

[facility]


Pneumococcal Polyvalent Vaccine (Pharmacy To Dose - Pneumonia)  1 each .XX 

ASDIRECTED [facility]


Triamcinolone Acetonide (Triamcinolone Acetonide 0.5%)  0 gm TOP TID [facility]


   Last Admin: 06/13/19 18:41 Dose:  Not Given











- Exam


General: Alert, Oriented


HEENT: Mucous Membr. Moist/Pink


Neck: Supple, Trachea Midline


Lungs: Decreased Breath Sounds, Rhonchi


Cardiovascular: Regular Rate, Regular Rhythm


GI/Abdominal Exam: Normal Bowel Sounds, Soft, Non-Tender, Hernia (ventral)


 (Male) Exam: Deferred


Back Exam: Normal Inspection


Extremities: Normal Inspection


Skin: Warm, Dry, Intact, Rash (chronic folliculitis/dermatitis over upper back)


Neurological: No New Focal Deficit


Psy/Mental Status: Alert, Normal Affect, Normal Mood





- Problem List & Annotations


(1) Pneumonia due to Pseudomonas aeruginosa


SNOMED Code(s): 65109129


   Code(s): J15.1 - PNEUMONIA DUE TO PSEUDOMONAS   Status: Acute   Priority: 

High   Current Visit: Yes   Onset Date: ~06/11/19   





- Problem List Review


Problem List Initiated/Reviewed/Updated: Yes





- My Orders


Last 24 Hours: 


My Active Orders





06/13/19 14:53


Patient Status [ADT] Routine 


Height and Weight [RC] DAILY 


May Shower [RC] ASDIRECTED 


Oxygen Therapy [RC] 2300 


Up ad Ayesha [RC] ASDIRECTED 


VTE/DVT Education [RC] DAILY 


Blood Culture x2 Reflex Set [OM.PC] Stat 


Resuscitation Status Routine 





06/13/19 14:55


Intake and Output [RC] QSHIFT 





06/13/19 14:56


Antiembolic Devices [RC] PER UNIT ROUTINE 


Antiembolic Hose [OM.PC] Per Unit Routine 





06/13/19 14:57


Peripheral IV Care [RC] 08,20 


Sodium Chloride 0.9% [Saline Flush]   10 ml FLUSH ASDIRECTED PRN 


Peripheral IV Insertion Adult [OM.PC] Routine 





06/13/19 15:03


Consult to Case Management/ [CONS] Routine 





06/13/19 15:08


CULTURE BLOOD [BC] Stat 





06/13/19 15:15


CULTURE BLOOD [BC] Stat 





06/13/19 16:00


Piperacillin/Tazobactam [Zosyn] 3.375 gm   Sodium Chloride 0.9% [Normal Saline] 

100 ml IV Q6H 


cefTAZidime Pentahydrate [Fortaz]   1 gm IVPUSH Q8HR 





06/13/19 17:12


Acetaminophen [Tylenol Extra Strength]   1,000 mg PO TID PRN 


Albuterol [Proventil Neb Soln]   2.5 mg NEB Q4HR PRN 


Albuterol [Ventolin HFA]   0 gm INH Q6H PRN 


Lidocaine HCl [Aspercreme]   1 applic TP TID PRN 


Loperamide [Imodium AD]   2 mg PO ASDIRECTED PRN 


Polyethylene Glycol 3350 [MiraLAX]   17 gm PO DAILY PRN 


Triamcinolone Acetonide [Triamcinolone Acetonide 0.025%]   0 gm TOP TID PRN 


Triamcinolone Acetonide [Triamcinolone Acetonide 0.5%]   0 gm TOP DAILY PRN 


hydrOXYzine HCl [Atarax]   25 mg PO BID PRN 


traMADol [Ultram]   50 mg PO BEDTIME PRN 





06/13/19 18:00


methylPREDNISolone Sod Succ [Solu-MEDROL]   40 mg IVPUSH Q8H 





06/13/19 20:00


Vital Signs [RC] QID 


Albuterol/Ipratropium [DuoNeb 3.0-0.5 MG/3 ML]   3 ml INH QID 


Triamcinolone Acetonide [Triamcinolone Acetonide 0.5%]   0 gm TOP TID@0800,1400,

2000 


hydrOXYzine HCl [Atarax]   25 mg PO BID@0800,2000 





06/13/19 Dinner


Regular Diet [DIET] 





06/14/19 08:00


Arformoterol [Brovana]   15 mcg INH Q12H 


Aspirin [Halfprin]   81 mg PO DAILY 


Fluticasone Propionate [Flonase]   0 gm NASBOTH DAILY 


Loratadine [Claritin]   10 mg PO DAILY 


Tiotropium [Spiriva HandiHaler]   18 mcg INH DAILY 


hydroCHLOROthiazide   25 mg PO DAILY 





06/15/19 05:11


C-REACTIVE PROTEIN [CHEM] AM 


CBC WITH AUTO DIFF [HEME] AM 


COMPREHENSIVE METABOLIC PN,CMP [CHEM] AM 





06/16/19 05:11


C-REACTIVE PROTEIN [CHEM] AM 


CBC WITH AUTO DIFF [HEME] AM 


COMPREHENSIVE METABOLIC PN,CMP [CHEM] AM 





06/17/19 05:11


C-REACTIVE PROTEIN [CHEM] AM 


CBC WITH AUTO DIFF [HEME] AM 


COMPREHENSIVE METABOLIC PN,CMP [CHEM] AM 














- Plan


Plan:: 





06-13-19


ARLETTE Mi PA-C


Patient is being admitted for IP treatment for the Pseudomonas aeruginosa 

pneumonia, using IV Fortaz and Zosyn per the C&S results.  He has long-standing 

oxygen-dependent COPD and Chronic Respiratory Failure with Hypoxia.  He will 

require at least 96 hours of IP treatment.  I did call up to the [facility] and 

spoke with Admissions Coordinator Claire, but they have no bed availability 

today.  Consult ordered for Care Coordinator [facility] if [facility] available.  Dr. Hicks notified of this 

admission at the time of admit.  





06-14-19


ARLETTE Mi PA-C


Patient says he is feeling a tiny bit better than yesterday, says yesterday was 

awful.  Denies any chest pain, says eating well.  I did speak with Dr. Borrego at 

the [facility] who wanted another set of ABG's so these are ordered and reported to 

Dr. Borrego, and he does accept for transfer.  Transfer via [facility] 

to [facility].

## 2019-06-14 NOTE — [id number]
**Discharge Summary





- Hospital Course


Free Text/Narrative:: 





Admitted IP for treatment of Pseudomonas aeruginosa pneumonia.  Had contacted 

[facility], patient's first choice for hospital, but there was no bed available.


Diagnosis: Stroke: No





- Discharge Data


Discharge Date: 06/14/19


Discharge Disposition: DC/Tfer to Acute Hospital 02


Condition: Fair





- Discharge Diagnosis/Problem(s)


(1) Pneumonia due to Pseudomonas aeruginosa


SNOMED Code(s): 17206954


   ICD Code: J15.1 - PNEUMONIA DUE TO PSEUDOMONAS   Status: Acute   Priority: 

High   Current Visit: Yes   Onset Date: ~06/11/19   





- Patient Summary/Data


Consults: 


 Consultations





06/13/19 15:03


Consult to Case Management/ [CONS] Routine 











Hospital Course: 





Treatment initiated with IV Fortaz and Zosyn per C&S report.  Oxygen 

continuously at 5L/m.





- Discharge Plan


*PRESCRIPTION DRUG MONITORING PROGRAM REVIEWED*: Not Applicable


*COPY OF PRESCRIPTION DRUG MONITORING REPORT IN PATIENT ERIK: Not Applicable


Home Medications: 


 Home Meds





Albuterol Sulfate [Proair Respiclick] 2 puff IH Q6HR PRN 01/22/19 [History]


Albuterol/Ipratropium [DuoNeb 3.0-0.5 MG/3 ML] 1 ampule INH QID 01/22/19 [

History]


Budesonide [Pulmicort] 1 ampule INH Q12HR 01/22/19 [History]


Calcium Carbonate/Vitamin D3 [OystCal-D 250 MG-125 Units] 2 tab PO DAILY 01/22/ 19 [History]


Cholecalciferol (Vitamin D3) [Vitamin D3] 2,000 unit PO DAILY 01/22/19 [History]


Cyanocobalamin (Vitamin B-12) [B-12] 1 tab PO DAILY 01/22/19 [History]


Eucalyptus Oil/Menthol/Camphor [Vicks Vaporub Ointment] 1 applic TP ASDIRECTED 

PRN 01/22/19 [History]


Loperamide [Imodium AD] 2 mg PO ASDIRECTED PRN 01/22/19 [History]


Loratadine 10 mg PO DAILY 01/22/19 [History]


Magnesium Oxide 420 mg PO BID 01/22/19 [History]


PARoxetine HCl [Paxil] 40 mg PO BEDTIME 01/22/19 [History]


Polyethylene Glycol 3350 [MiraLAX] 17 gm PO DAILY PRN 01/22/19 [History]


hydroCHLOROthiazide [Hydrochlorothiazide] 25 mg PO DAILY 01/22/19 [History]


Acetaminophen [Tylenol Extra Strength] 1,000 mg PO DAILY@16 06/13/19 [History]


Acetaminophen [Tylenol Extra Strength] 1,000 mg PO TID PRN 06/13/19 [History]


Albuterol Sulfate 2.5 mg NEB Q4HR PRN 06/13/19 [History]


Aspirin [Lo-Dose Aspirin EC] 81 mg PO DAILY 06/13/19 [History]


Fluticasone Propionate [Flonase] 2 sprays NS DAILY 06/13/19 [History]


Lidocaine HCl [Aspercreme] 1 applic TP TID PRN 06/13/19 [History]


Mirtazapine 60 mg PO BEDTIME 06/13/19 [History]


Non-Formulary Medication [NF Drug] 2 puff INH DAILY 06/13/19 [History]


Non-Formulary Medication [NF Drug] 2 puff INH DAILY 06/13/19 [History]


Triamcinolone Acetonide [Triamcinolone Acetonide 0.025%] 1 applic TOP TID PRN 06 /13/19 [History]


Triamcinolone Acetonide [Triamcinolone Acetonide 0.5%] 1 applic .XX DAILY PRN 06 /13/19 [History]


Triamcinolone Acetonide [Triamcinolone Acetonide 0.5%] 1 applic TOP TID 06/13/ 19 [History]


hydrOXYzine HCl [Atarax] 25 mg PO BID 06/13/19 [History]


hydrOXYzine HCl [Atarax] 25 mg PO BID PRN 06/13/19 [History]


predniSONE [Prednisone] 5 mg PO DAILY 06/13/19 [History]


traMADol [Ultram] 50 mg PO BEDTIME PRN 06/13/19 [History]








Oxygen Therapy Mode: Nasal Cannula


Oxygen Flow Rate (L/min): 5


Patient Handouts:  Healthcare-Associated Pneumonia





- Discharge Summary/Plan Comment


DC Time >30 min.: Yes


Discharge Summary/Plan Comment: 





Transferred today to the [facility] for continuing care for Pseudomonas aeruginosa 

pneumonia, via [facility]. 





- Patient Data


Vitals - Most Recent: 


 Last Vital Signs











Temp  97.9 F   06/14/19 12:00


 


Pulse  99   06/14/19 12:00


 


Resp  16   06/14/19 12:00


 


BP  128/68   06/14/19 12:00


 


Pulse Ox  94 L  06/14/19 12:00











Weight - Most Recent: 182 lb 15.986 oz


I&O - Last 24 hours: 


 Intake & Output











 06/13/19 06/14/19 06/14/19





 22:59 06:59 14:59


 


Intake Total 


 


Balance 











Lab Results - Last 24 hrs: 


 Laboratory Results - last 24 hr











  06/14/19 06/14/19 06/14/19 Range/Units





  07:20 07:20 12:38 


 


WBC  6.4    (4.0-10.2)  K/uL


 


RBC  3.44 L    (4.33-5.41)  M/uL


 


Hgb  11.7 L    (13.1-16.8)  g/dL


 


Hct  36.2 L    (39.0-49.0)  %


 


MCV  105.2 H    (84.0-98.0)  fL


 


MCH  34.0 H    (28.2-33.3)  pg


 


MCHC  32.3    (31.7-36.0)  g/dL


 


RDW  12.9    (11.2-14.1)  %


 


Plt Count  238    (150-350)  K/uL


 


Neut % (Auto)  82.8 H    (45.0-80.0)  %


 


Lymph % (Auto)  12.5    (10.0-50.0)  %


 


Mono % (Auto)  4.5    (2.0-14.0)  %


 


Eos % (Auto)  0.0    (0.0-5.0)  %


 


Baso % (Auto)  0.2    (0.0-2.0)  %


 


Neut # (Auto)  5.28    (1.40-7.00)  K/uL


 


Lymph # (Auto)  0.80    (0.50-3.50)  K/uL


 


Mono # (Auto)  0.29    (0.00-1.00)  K/uL


 


Eos # (Auto)  0.00    (0.00-0.50)  K/uL


 


Baso # (Auto)  0.01    (0.00-0.20)  K/uL


 


ABG pH    7.38  (7.35-7.45)  


 


ABG pCO2    57 H*  (35-45)  mmHG


 


ABG pO2    47 L*  ()  mmHG


 


ABG HCO3    33.6 H  (22-26)  mmol/L


 


ABG Total CO2    35 H  (23-27)  mmol/L


 


ABG O2 Saturation    80 L  (95-98)  %


 


ABG Base Excess    8 H  (-2-3)  mmol/L


 


O2 Delivery Device    Nasal cannula  


 


Sodium   143   (136-145)  mmol/L


 


Potassium   3.7   (3.5-5.1)  mmol/L


 


Chloride   103   ()  mmol/L


 


Carbon Dioxide   33.5 H   (21.0-32.0)  mmol/L


 


BUN   20 H   (7-18)  mg/dL


 


Creatinine   1.01   (0.51-1.17)  mg/dL


 


Est Cr Clr Drug Dosing   51.68   mL/min


 


Estimated GFR (MDRD)   > 60   mL/min


 


Glucose   153 H   ()  mg/dL


 


Calcium   8.6   (8.5-10.1)  mg/dL


 


Total Bilirubin   0.6   (0.2-1.0)  mg/dL


 


AST   17   (15-37)  U/L


 


ALT   19   (12-78)  U/L


 


Alkaline Phosphatase   75   ()  IU/L


 


C-Reactive Protein   2.2 H   (<=0.9)  mg/dL


 


NT-Pro-B Natriuret Pep   494 H   (0-125)  pg/mL


 


Total Protein   6.5   (6.4-8.2)  g/dL


 


Albumin   3.2 L   (3.4-5.0)  g/dL











Med Orders - Current: 


 Current Medications





Acetaminophen (Tylenol Extra Strength)  1,000 mg PO TID PRN


   PRN Reason: Pain


Acetaminophen (Tylenol Extra Strength)  1,000 mg PO DAILY@1600 [facility]


Albuterol (Proventil Neb Soln)  2.5 mg NEB Q4HR PRN


   PRN Reason: Shortness of Breath


   Last Admin: 06/13/19 17:47 Dose:  2.5 mg


Albuterol (Ventolin Hfa)  0 gm INH Q6H PRN


   PRN Reason: Shortness of Breath


Albuterol/Ipratropium (Duoneb 3.0-0.5 Mg/3 Ml)  3 ml INH QID IVÁN


   Last Admin: 06/14/19 11:48 Dose:  3 ml


Arformoterol Tartrate (Brovana)  15 mcg INH Q12H [facility]


   Last Admin: 06/14/19 08:24 Dose:  15 mcg


Aspirin (Halfprin)  81 mg PO DAILY [facility]


   Last Admin: 06/14/19 08:23 Dose:  81 mg


Ceftazidime (Fortaz)  1 gm IVPUSH Q8HR [facility]


   Last Admin: 06/14/19 08:24 Dose:  1 gm


Fluticasone Propionate (Flonase)  0 gm NASBOTH DAILY [facility]


   Last Admin: 06/14/19 08:24 Dose:  2 sprays


Hydrochlorothiazide (Hydrochlorothiazide)  25 mg PO DAILY [facility]


   Last Admin: 06/14/19 08:23 Dose:  25 mg


Hydroxyzine HCl (Atarax)  25 mg PO BID PRN


   PRN Reason: Itching


Hydroxyzine HCl (Atarax)  25 mg PO BID@0800,2000 [facility]


   Last Admin: 06/14/19 08:23 Dose:  25 mg


Piperacillin Sod/Tazobactam (Sod 3.375 gm/ Sodium Chloride)  100 mls @ 200 mls/

hr IV Q6H [facility]


   Last Admin: 06/14/19 09:02 Dose:  200 mls/hr


Loperamide HCl (Imodium Ad)  2 mg PO ASDIRECTED PRN


   PRN Reason: Diarrhea


Loratadine (Claritin)  10 mg PO DAILY [facility]


   Last Admin: 06/14/19 08:23 Dose:  10 mg


Methylprednisolone Sodium Succinate (Solu-Medrol)  40 mg IVPUSH Q8H [facility]


   Last Admin: 06/14/19 09:01 Dose:  40 mg


Mirtazapine (Remeron)  60 mg PO BEDTIME [facility]


   Last Admin: 06/13/19 22:29 Dose:  60 mg


Lidocaine Hcl [ Aspercreme] 1 Applic )  1 applic TP TID PRN


   PRN Reason: Pain


Paroxetine HCl (Paxil)  40 mg PO BEDTIME [facility]


   Last Admin: 06/13/19 22:29 Dose:  40 mg


Polyethylene Glycol (Miralax)  17 gm PO DAILY PRN


   PRN Reason: Constipation


Sodium Chloride (Saline Flush)  10 ml FLUSH ASDIRECTED PRN


   PRN Reason: Keep Vein Open


   Last Admin: 06/14/19 09:03 Dose:  10 ml


Sodium Chloride (Saline Flush)  10 ml FLUSH Q12HR [facility]


   Last Admin: 06/14/19 08:25 Dose:  10 ml


Tiotropium Bromide (Spiriva Handihaler)  18 mcg INH DAILY [facility]


   Last Admin: 06/14/19 08:25 Dose:  18 mcg


Tramadol HCl (Ultram)  50 mg PO BEDTIME PRN


   PRN Reason: Pain


Triamcinolone Acetonide (Triamcinolone Acetonide 0.025%)  0 gm TOP TID PRN


   PRN Reason: Itching


Triamcinolone Acetonide (Triamcinolone Acetonide 0.5%)  0 gm TOP DAILY PRN


   PRN Reason: Itching


Triamcinolone Acetonide (Triamcinolone Acetonide 0.5%)  0 gm TOP TID@0800,1400,

2000 [facility]


   Last Admin: 06/14/19 09:01 Dose:  1 applic





Discontinued Medications





Acetaminophen (Tylenol Extra Strength)  1,000 mg PO DAILY [facility]


Budesonide (Pulmicort)   mg INH BID [facility]


Hydroxyzine HCl (Atarax)  25 mg PO BID [facility]


Mirtazapine (Remeron)  60 mg PO DAILY [facility]


Non-Formulary Medication (Cholecalciferol (Vitamin D3) [Vitamin D3])  2,000 

unit PO DAILY [facility]


Non-Formulary Medication (Cyanocobalamin (Vitamin B-12) [B-12])  1 tab PO DAILY 

[facility]


Pneumococcal Polyvalent Vaccine (Pharmacy To Dose - Pneumonia)  1 each .XX 

ASDIRECTED [facility]


Triamcinolone Acetonide (Triamcinolone Acetonide 0.5%)  0 gm TOP TID [facility]


   Last Admin: 06/13/19 18:41 Dose:  Not Given